# Patient Record
Sex: MALE | Race: WHITE | Employment: FULL TIME | URBAN - METROPOLITAN AREA
[De-identification: names, ages, dates, MRNs, and addresses within clinical notes are randomized per-mention and may not be internally consistent; named-entity substitution may affect disease eponyms.]

---

## 2022-01-18 ENCOUNTER — OFFICE VISIT (OUTPATIENT)
Age: 57
End: 2022-01-18
Payer: OTHER GOVERNMENT

## 2022-01-18 ENCOUNTER — HOSPITAL ENCOUNTER (OUTPATIENT)
Dept: INFUSION THERAPY | Age: 57
Discharge: HOME | End: 2022-01-18
Payer: OTHER GOVERNMENT

## 2022-01-18 VITALS
HEIGHT: 67 IN | OXYGEN SATURATION: 96 % | SYSTOLIC BLOOD PRESSURE: 139 MMHG | TEMPERATURE: 97.1 F | HEART RATE: 69 BPM | BODY MASS INDEX: 31.86 KG/M2 | RESPIRATION RATE: 12 BRPM | WEIGHT: 203 LBS | DIASTOLIC BLOOD PRESSURE: 88 MMHG

## 2022-01-18 DIAGNOSIS — D69.6 THROMBOCYTOPENIA (HCC): Primary | ICD-10-CM

## 2022-01-18 DIAGNOSIS — D69.6 THROMBOCYTOPENIA (HCC): ICD-10-CM

## 2022-01-18 LAB
25(OH)D3 SERPL-MCNC: 38.1 NG/ML (ref 30–100)
ALBUMIN SERPL-MCNC: 4.2 G/DL (ref 3.4–5)
ALBUMIN/GLOB SERPL: 1.3 {RATIO} (ref 0.8–1.7)
ALP SERPL-CCNC: 77 U/L (ref 45–117)
ALT SERPL-CCNC: 28 U/L (ref 16–61)
ANION GAP SERPL CALC-SCNC: 6 MMOL/L (ref 3–18)
AST SERPL-CCNC: 9 U/L (ref 10–38)
BASOPHILS # BLD: 0.1 K/UL (ref 0–0.1)
BASOPHILS NFR BLD: 1 % (ref 0–2)
BILIRUB SERPL-MCNC: 0.5 MG/DL (ref 0.2–1)
BUN SERPL-MCNC: 23 MG/DL (ref 7–18)
BUN/CREAT SERPL: 18 (ref 12–20)
CALCIUM SERPL-MCNC: 9.5 MG/DL (ref 8.5–10.1)
CHLORIDE SERPL-SCNC: 106 MMOL/L (ref 100–111)
CO2 SERPL-SCNC: 28 MMOL/L (ref 21–32)
CREAT SERPL-MCNC: 1.25 MG/DL (ref 0.6–1.3)
CRP SERPL-MCNC: <0.3 MG/DL (ref 0–0.3)
DIFFERENTIAL METHOD BLD: NORMAL
EOSINOPHIL # BLD: 0.1 K/UL (ref 0–0.4)
EOSINOPHIL NFR BLD: 1 % (ref 0–5)
ERYTHROCYTE [DISTWIDTH] IN BLOOD BY AUTOMATED COUNT: 13 % (ref 11.6–14.5)
ERYTHROCYTE [SEDIMENTATION RATE] IN BLOOD: 8 MM/HR (ref 0–20)
FERRITIN SERPL-MCNC: 84 NG/ML (ref 8–388)
FOLATE SERPL-MCNC: 16.3 NG/ML (ref 3.1–17.5)
GLOBULIN SER CALC-MCNC: 3.2 G/DL (ref 2–4)
GLUCOSE SERPL-MCNC: 85 MG/DL (ref 74–99)
HCT VFR BLD AUTO: 45.1 % (ref 36–48)
HGB BLD-MCNC: 15.3 G/DL (ref 13–16)
IGA SERPL-MCNC: 148 MG/DL (ref 70–400)
IGG SERPL-MCNC: 982 MG/DL (ref 700–1600)
IGM SERPL-MCNC: 341 MG/DL (ref 40–230)
IMM GRANULOCYTES # BLD AUTO: 0 K/UL (ref 0–0.04)
IMM GRANULOCYTES NFR BLD AUTO: 0 % (ref 0–0.5)
IRON SATN MFR SERPL: 30 % (ref 20–50)
IRON SERPL-MCNC: 86 UG/DL (ref 50–175)
LYMPHOCYTES # BLD: 1.8 K/UL (ref 0.9–3.6)
LYMPHOCYTES NFR BLD: 28 % (ref 21–52)
MCH RBC QN AUTO: 30.5 PG (ref 24–34)
MCHC RBC AUTO-ENTMCNC: 33.9 G/DL (ref 31–37)
MCV RBC AUTO: 89.8 FL (ref 78–100)
MONOCYTES # BLD: 0.4 K/UL (ref 0.05–1.2)
MONOCYTES NFR BLD: 6 % (ref 3–10)
NEUTS SEG # BLD: 4.1 K/UL (ref 1.8–8)
NEUTS SEG NFR BLD: 65 % (ref 40–73)
NRBC # BLD: 0 K/UL (ref 0–0.01)
NRBC BLD-RTO: 0 PER 100 WBC
PLATELET # BLD AUTO: 176 K/UL (ref 135–420)
PMV BLD AUTO: 11.6 FL (ref 9.2–11.8)
POTASSIUM SERPL-SCNC: 4.4 MMOL/L (ref 3.5–5.5)
PROT SERPL-MCNC: 7.4 G/DL (ref 6.4–8.2)
RBC # BLD AUTO: 5.02 M/UL (ref 4.35–5.65)
SODIUM SERPL-SCNC: 140 MMOL/L (ref 136–145)
TIBC SERPL-MCNC: 288 UG/DL (ref 250–450)
TSH SERPL DL<=0.05 MIU/L-ACNC: 6.7 UIU/ML (ref 0.36–3.74)
VIT B12 SERPL-MCNC: 418 PG/ML (ref 211–911)
WBC # BLD AUTO: 6.3 K/UL (ref 4.6–13.2)

## 2022-01-18 PROCEDURE — 99204 OFFICE O/P NEW MOD 45 MIN: CPT | Performed by: INTERNAL MEDICINE

## 2022-01-18 PROCEDURE — 85652 RBC SED RATE AUTOMATED: CPT

## 2022-01-18 PROCEDURE — 84165 PROTEIN E-PHORESIS SERUM: CPT

## 2022-01-18 PROCEDURE — 84443 ASSAY THYROID STIM HORMONE: CPT

## 2022-01-18 PROCEDURE — 85025 COMPLETE CBC W/AUTO DIFF WBC: CPT

## 2022-01-18 PROCEDURE — 83540 ASSAY OF IRON: CPT

## 2022-01-18 PROCEDURE — 82525 ASSAY OF COPPER: CPT

## 2022-01-18 PROCEDURE — 80053 COMPREHEN METABOLIC PANEL: CPT

## 2022-01-18 PROCEDURE — 82784 ASSAY IGA/IGD/IGG/IGM EACH: CPT

## 2022-01-18 PROCEDURE — 82728 ASSAY OF FERRITIN: CPT

## 2022-01-18 PROCEDURE — 86140 C-REACTIVE PROTEIN: CPT

## 2022-01-18 PROCEDURE — 82306 VITAMIN D 25 HYDROXY: CPT

## 2022-01-18 PROCEDURE — 36415 COLL VENOUS BLD VENIPUNCTURE: CPT

## 2022-01-18 PROCEDURE — 82607 VITAMIN B-12: CPT

## 2022-01-18 RX ORDER — LEVOTHYROXINE SODIUM 137 UG/1
1 TABLET ORAL DAILY
COMMUNITY
Start: 2021-02-08 | End: 2022-02-08

## 2022-01-18 NOTE — LETTER
1/18/2022    Patient: Radha Dow   YOB: 1965   Date of Visit: 1/18/2022     Sol Baptiste DO  [address] [address]  Via Fax: [phone]     [address]  [address]  Via Fax: [phone]    Dear DO Chio Small, CNP,      Thank you for referring Mr. Radha Dwo to [facility] for evaluation. My notes for this consultation are attached. If you have questions, please do not hesitate to call me. I look forward to following your patient along with you.       Sincerely,    Ajith Perez MD

## 2022-01-18 NOTE — PROGRESS NOTES
Hematology/Oncology  Progress Note    Name: Duong Morales  Date: 2022  : 1965  Primary Care Provider: Ant Gerardo DO    Mr. Otoniel Kahn is a 49y.o. year old male with mild thrombocytopenia 130  Patient understands that was in  . At that time he had inflammation of his right shoulder and was taking quite a bit of Motrin to address the inflamation. Patient had no prior history of having lower platelet count no history of bleeding and  also no family history of low platelet count or  tendency to bleed. Patient has a history of Hashimoto's and is now on thyroid replacement    Current Therapy: Diagnostic evaluation and treatment with    Subjective: The past medical, surgical and social history has been reviewed and remains unchanged. History reviewed. No pertinent past medical history.   Past Surgical History:   Procedure Laterality Date    HX HERNIA REPAIR      HX MENISCUS REPAIR Left     HX PARTIAL THYROIDECTOMY       Social History     Socioeconomic History    Marital status:      Spouse name: Not on file    Number of children: Not on file    Years of education: Not on file    Highest education level: Not on file   Occupational History    Not on file   Tobacco Use    Smoking status: Former Smoker     Packs/day: 0.25     Quit date: 2018     Years since quitting: 3.2    Smokeless tobacco: Never Used   Vaping Use    Vaping Use: Not on file   Substance and Sexual Activity    Alcohol use: Yes     Comment: 4 beers per week x32 years    Drug use: Never    Sexual activity: Yes   Other Topics Concern    Not on file   Social History Narrative    Not on file     Social Determinants of Health     Financial Resource Strain:     Difficulty of Paying Living Expenses: Not on file   Food Insecurity:     Worried About Running Out of Food in the Last Year: Not on file    Arpit of Food in the Last Year: Not on file   Transportation Needs:     Lack of Transportation (Medical): Not on file    Lack of Transportation (Non-Medical): Not on file   Physical Activity:     Days of Exercise per Week: Not on file    Minutes of Exercise per Session: Not on file   Stress:     Feeling of Stress : Not on file   Social Connections:     Frequency of Communication with Friends and Family: Not on file    Frequency of Social Gatherings with Friends and Family: Not on file    Attends Yazdanism Services: Not on file    Active Member of [address] [place] [address] [employer] or Organizations: Not on file    Attends Club or Organization Meetings: Not on file    Marital Status: Not on file   Intimate Partner Violence:     Fear of Current or Ex-Partner: Not on file    Emotionally Abused: Not on file    Physically Abused: Not on file    Sexually Abused: Not on file   Housing Stability:     Unable to Pay for Housing in the Last Year: Not on file    Number of [place] in the Last Year: Not on file    Unstable Housing in the Last Year: Not on file     Family History   Problem Relation Age of Onset    Breast Cancer Mother     Stroke Mother     Diabetes Father     Hypertension Father      Current Outpatient Medications   Medication Sig Dispense Refill    levothyroxine (Synthroid) 137 mcg tablet Take 1 Tablet by mouth daily. Review of Systems:    General :The patient has no complaints and there is no physical distress evident. Psychological : patient denies having any psychological symptoms such as hallucinations depression or anxiety. Ophthalmic:the patient denies having any visual impairment or eye discomfort. Allergy and Immunology:the patient denies having any seasonal allergies or allergies to medications other than those already outlined above. Hematological and Lymphatic: the patient denies having any bruising, bleeding or lymphadenopathy. Endocrine: the patient denies having any heat or cold intolerance.  There is no history of diabetes     Respiratory:the patient denies having any cough, shortness of breath, or dyspnea on exertion. Cardiovascular: there are no complaints of chest pain, palpitations, chest pounding, or dyspnea on exertion. Gastrointestinal: the patient denies having nausea, emesis, diarrhea, constipation, or blood in the stool. Genito-Urinary: the patient denies having urinary urgency, frequency, or dysuria. Musculoskeletal: with the exception of mild arthralgias the patient has no other musculoskeletal complaints. Neurological:  denies having any numbness, tingling, or neurologic deficits. Dermatological: patient denies having any unexplained rash, skin ulcerations, or hives. Objective: There were no vitals taken for this visit. Pain Score: 0    Physical Exam:     ECO    General: Well appearing, in NAD    Psychologic: mood and affect are appropriate, no anxiety or depression noted    Skin: examination of the skin reveals no bruising, rash or petechiae    HEENT: Normocephalic, atraumatic. Conjunctiva and sclera are clear. Pupils are equal, round and reactive to light. EOMs are intact. Neck: supple without lymphadenopathy, JVD or thyromegaly    Lymphatics: no palpable cervical, supraclavicular, infraclavicular, axillary or inguinal lymphadenopathy    Lungs: clear breath sounds bilaterally, no rhonchi or wheezes noted    Heart: S1-S2 noted. Abdomen: soft, non-tender, non-distended, no HSM, positive bowel sounds    Extremities: without clubbing, cyanosis or edema    Neurologic: no focal deficits, steady gait, Alert and oriented x 3. Laboratory Data:     No results found for this or any previous visit. There is no problem list on file for this patient. Assessment:     1. Thrombocytopenia ([address]        Plan:     1. Mild thrombocytopenia with no history of thrombocytopenia no bleeding tendencies. 2. We shall obtain a number of blood test to see whether there is an obvious cause of it.   3. We shall reevaluate the patient with labs in 3 weeks by telephone and  4. Inform the patient that the results of the blood test may point us to do more blood test alternatively may hopefully be normal.  5. Patient had opportunity to ask questions which were answered. 6. Patient is in agreement with this plan       Orders Placed This Encounter    levothyroxine (Synthroid) 137 mcg tablet     Sig: Take 1 Tablet by mouth daily.        Sheri Walker MD  1/18/2022

## 2022-01-18 NOTE — PROGRESS NOTES
[facility] Progress Note    Date: 2022    Name: Ivan Briones    MRN: [id number]         : 1965    Peripheral Lab Draw      Mr. Selena Williamson to [facility], ambulatory at [phone] accompanied by self. Pt was assessed and education was provided. Mr. Pinky Levy vitals were reviewed and patient was observed for 5 minutes prior to treatment. There were no vitals taken for this visit. Blood obtained peripherally from left arm x 1 attempt with butterfly needle and sent to lab  per written orders. No bleeding or hematoma noted at site. Gauze and coban applied. Mr. Selena Williamson tolerated the phlebotomy, and had no complaints. Patient armband removed and shredded. Mr. Selena Williamson was discharged from [place] [address] in stable condition at 1405.      Gisele Frederick Phlebotomist PCT  2022  2:29 PM

## 2022-01-20 LAB
ALBUMIN SERPL ELPH-MCNC: 4.1 G/DL (ref 2.9–4.4)
ALBUMIN/GLOB SERPL: 1.4 {RATIO} (ref 0.7–1.7)
ALPHA1 GLOB SERPL ELPH-MCNC: 0.2 G/DL (ref 0–0.4)
ALPHA2 GLOB SERPL ELPH-MCNC: 0.6 G/DL (ref 0.4–1)
B-GLOBULIN SERPL ELPH-MCNC: 1 G/DL (ref 0.7–1.3)
COPPER SERPL-MCNC: 95 UG/DL (ref 69–132)
GAMMA GLOB SERPL ELPH-MCNC: 1.3 G/DL (ref 0.4–1.8)
GLOBULIN SER CALC-MCNC: 3 G/DL (ref 2.2–3.9)
M PROTEIN SERPL ELPH-MCNC: NORMAL G/DL
PROT SERPL-MCNC: 7.1 G/DL (ref 6–8.5)

## 2022-01-21 LAB
IGA SERPL-MCNC: 161 MG/DL (ref 90–386)
IGG SERPL-MCNC: 1009 MG/DL (ref 603–1613)
IGM SERPL-MCNC: 408 MG/DL (ref 20–172)
PROT PATTERN SERPL IFE-IMP: ABNORMAL

## 2022-02-08 ENCOUNTER — VIRTUAL VISIT (OUTPATIENT)
Age: 57
End: 2022-02-08
Payer: OTHER GOVERNMENT

## 2022-02-08 DIAGNOSIS — D69.6 THROMBOCYTOPENIA (HCC): Primary | ICD-10-CM

## 2022-02-08 PROCEDURE — 99443 PR PHYS/QHP TELEPHONE EVALUATION 21-30 MIN: CPT | Performed by: INTERNAL MEDICINE

## 2022-02-08 NOTE — PROGRESS NOTES
David Talbert is a 49 y.o. male, evaluated via audio-only technology on 2/8/2022 for Follow-up (Mild thrombocytopenia )  . Assessment & Plan:   Patient's repeat complete blood count January 18, 2022 revealed a platelet count of 985A. His CBC was normal overall. He had a slight elevation of polyclonal IgM. Otherwise the laboratories have been normal    We plan to repeat this blood test in 6 months. If they remain normal we shall stop following them and patient will continue follow-up with his primary care provider    Patient had opportunity to ask questions which were answered. He is in agreement with this plan      12  Subjective:          Mr. Luda Giang is a 49y.o. year old male with a history of mild thrombocytopenia 130  Patient understands that was at that time he had inflammation of his right shoulder and was taking quite a bit of Motrin to address the inflamation.     Patient had no prior history of having lower platelet count, no history of bleeding and  also no family history of low platelet count nor  tendency to bleed. Prior to Admission medications    Medication Sig Start Date End Date Taking? Authorizing Provider   levothyroxine (Synthroid) 137 mcg tablet Take 1 Tablet by mouth daily. 2/8/21 2/8/22  Provider, Historical     There is no problem list on file for this patient. Current Outpatient Medications   Medication Sig Dispense Refill    levothyroxine (Synthroid) 137 mcg tablet Take 1 Tablet by mouth daily. No Known Allergies  History reviewed. No pertinent past medical history. Patient-Reported Vitals 2/8/2022   Patient-Reported Weight 186       David Talbert, who was evaluated through a patient-initiated, synchronous (real-time) audio only encounter, and/or her healthcare decision maker, is aware that it is a billable service, with coverage as determined by his insurance carrier. He provided verbal consent to proceed: Yes.  He has not had a related appointment within my department in the past 7 days or scheduled within the next 24 hours. On this date 02/08/2022 I have spent 28 minutes reviewing previous notes, test results and face to face (virtual) with the patient discussing the diagnosis and importance of compliance with the treatment plan as well as documenting on the day of the visit.     Nat Carrillo MD